# Patient Record
Sex: MALE | Race: WHITE | Employment: STUDENT | URBAN - METROPOLITAN AREA
[De-identification: names, ages, dates, MRNs, and addresses within clinical notes are randomized per-mention and may not be internally consistent; named-entity substitution may affect disease eponyms.]

---

## 2018-07-21 ENCOUNTER — HOSPITAL ENCOUNTER (EMERGENCY)
Facility: CLINIC | Age: 18
Discharge: HOME | End: 2018-07-21
Attending: NURSE PRACTITIONER | Admitting: NURSE PRACTITIONER
Payer: COMMERCIAL

## 2018-07-21 VITALS
OXYGEN SATURATION: 97 % | DIASTOLIC BLOOD PRESSURE: 63 MMHG | RESPIRATION RATE: 16 BRPM | SYSTOLIC BLOOD PRESSURE: 119 MMHG | TEMPERATURE: 97.9 F | BODY MASS INDEX: 24.05 KG/M2 | WEIGHT: 168 LBS | HEIGHT: 70 IN

## 2018-07-21 DIAGNOSIS — S61.412A LACERATION OF LEFT HAND WITHOUT FOREIGN BODY, INITIAL ENCOUNTER: ICD-10-CM

## 2018-07-21 DIAGNOSIS — M79.642 PAIN OF LEFT HAND: ICD-10-CM

## 2018-07-21 PROCEDURE — 12002 RPR S/N/AX/GEN/TRNK2.6-7.5CM: CPT

## 2018-07-21 PROCEDURE — 99283 EMERGENCY DEPT VISIT LOW MDM: CPT

## 2018-07-21 ASSESSMENT — ENCOUNTER SYMPTOMS
WEAKNESS: 0
WOUND: 1
NUMBNESS: 0

## 2018-07-21 NOTE — ED PROVIDER NOTES
"  History     Chief Complaint:  Laceration    HPI   Mahesh Pratt is a pleasant, right handed, 17 year old male who presents to the emergency department with his mother for evaluation of a left hand laceration. 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 He is getting ready to move to [place] with a group of 100 students, is a part of a work crew and would like to be evaluated before the trip.       Allergies:  No known drug allergies.     Medications:    The patient is currently on no regular medications.     Past Medical History:    History reviewed.  No significant past medical history.      Past Surgical History:    History reviewed. No pertinent past surgical history.     Family History:    History reviewed. No pertinent family history.     Social History:  Marital Status:  Single   Smoking status: No   Alcohol use: No        Review of Systems   Skin: Positive for wound.   Neurological: Negative for weakness and numbness.   All other systems reviewed and are negative.        Physical Exam   First Vitals:  BP: 119/63  Heart Rate: 76  Temp: 97.9  F (36.6  C)  Resp: 16  Height: 177.8 cm (5' 10\")  Weight: 76.2 kg (168 lb)  SpO2: 97 %      Physical Exam  Physical Exam   Constitutional:  Alert, Sitting up on bed. Friendly.   Head: Atraumatic.  Head moves freely with normal range of motion.   Eyes: Conjunctivae pink. EOMs intact.   Neck: Normal range of motion.   Cardiovascular: Intact distal pulses:  Radial pulse 2+ on the left.   Pulmonary/Chest: No respiratory distress.   Musculoskeletal: Distal capillary refill and sensation intact. Tendon function intact. Normal flexion and extention of the fingers and hand.   Neurological: Oriented to person, place, and time. No focal deficits.  Skin: There is a 3cm laceration noted to the left hand at the palmar aspect running vertically over the 5th " metacarpal up to the base of the pinky. Surrounding skin is warm with no erythema or heat to touch.       He had a 2+ left radial pulse. . Distal cap reful intact.       Emergency Department Course   Procedures:    Narrative: Procedure: Laceration Repair        LACERATION:  A simple clean 3 cm laceration.      LOCATION:  Left hand at the palmar aspect      FUNCTION:  Distally sensation and circulation are intact.      ANESTHESIA:  1% Lidocaine with Epi      PREPARATION:  Irrigation with Normal Saline and Shur Clens      DEBRIDEMENT:  no debridement      CLOSURE:  Wound was closed with 5 x 5.0 Ethylon, 5 interrupted sutures. No foreign bodies.       Emergency Department Course:  Past medical records, nursing notes, and vitals reviewed.  1459: I performed an exam of the patient and obtained history, as documented above.       Rechecked the patient, findings and plan explained to the patient. Patient discharged home, status improved, with instructions regarding supportive care, medications, and reasons to return as well as the importance of close follow-up was reviewed.    Impression & Plan    Medical Decision Making:  Rodolfo Pratt is a 17 year old male here with a simple laceration to his left hand. Tetanus is up to date. Normal tendon function on exam. Wound explored with no FB and no tendon or bony involvement. 5 sutures placed. We discussed wound care and reasons to return here. As well as the need for suture removal in 10 days. He is amenable to plan.         Diagnosis:    ICD-10-CM    1. Pain of left hand M79.642    2. Laceration of left hand without foreign body, initial encounter S61.412A        Disposition:  discharged to home    Discharge Medications:  There are no discharge medications for this patient.    [place], am serving as a scribe at 2:59 PM on 7/21/2018 to document services personally performed by Hilda Dillon* based on my observations and the provider's statements to me.      EMERGENCY DEPARTMENT       Hilda Dillon, APRN CNP  07/21/18 181

## 2018-07-21 NOTE — ED AVS SNAPSHOT
Emergency Department    [address] [facility] [address]    Phone:  [phone]    Fax:  [phone]                                       Mahesh Pratt   MRN: [id number]    Department:   Emergency Department   Date of Visit:  7/21/2018           Patient Information     Date Of Birth          2000        Your diagnoses for this visit were:     Pain of left hand     Laceration of left hand without foreign body, initial encounter        You were seen by Hilda Dillon APRN CNP.      Follow-up Information     Follow up with [facility] In 10 days.    Why:  for suture removal    Contact information:    [facility]  [address] [place] [phone]  [phone]          Discharge Instructions         Hand Laceration: All Closures  A laceration is a cut through the skin. Deep cuts usually require stitches. Minor cuts may be closed with surgical tape or skin adhesive.   X-rays may be done if something may have entered the skin through the cut, such as broken glass. You may also be given a tetanus shot if you are not up to date on this vaccination and the object that cut you may carry tetanus.    Home care    Your healthcare provider may prescribe an antibiotic. This is to help prevent infection. Follow all instructions for taking this medicine. Take the medicine every day until it is gone or you are told to stop. You should not have any left over.    The healthcare provider may prescribe medicines for pain. Follow instructions for taking them.    Follow the healthcare provider s instructions on how to care for the cut.    Keep the wound clean and dry. Don't get the wound wet until you are told it is OK to do so. If the bandage gets wet, remove it. Gently pat the wound dry with a clean cloth. Then put on a clean, dry bandage.    To help prevent infection, wash your hands with soap and water before and after caring for the wound.     Caring for stiches: Once you no  longer need to keep the stitches dry, clean the wound daily. First, remove the bandage. Then wash the area gently with soap and warm water, or as directed by the healthcare provider. Use a wet cotton swab to loosen and remove any blood or crust that forms. After cleaning, apply a thin layer of antibiotic ointment if advised. Then put on a new bandage unless you are told not to.    Caring for skin glue: Don t put apply liquid, ointment, or cream on the wound while the glue is in place. Avoid activities that cause heavy sweating. Protect the wound from sunlight. Don't scratch, rub, or pick at the adhesive film. Don't place tape directly over the film. The glue should peel off within 5 to 10 days.     Caring for surgical tape: Keep the area dry. If it gets wet, blot it dry with a clean towel. Surgical tape usually falls off within 7 to 10 days. If it has not fallen off after 10 days, you can take it off yourself. Put mineral oil or petroleum jelly on a cotton ball and gently rub the tape until it is removed.    Once you can get the wound wet, you may shower as usual, but don't soak the wound in water. This means no tub baths or swimming.    Even with proper treatment, a wound infection may sometimes occur. Check the wound daily for signs of infection listed below.  Follow-up care  Follow up with your healthcare provider, or as advised. If you have stitches, be sure to return as directed to have them removed.  When to seek medical advice  Call your healthcare provider right away if any of these occur:    Wound bleeding not controlled by direct pressure    Signs of infection, including increasing pain in the wound, increasing wound redness or swelling, or pus or bad odor coming from the wound    Fever of 100.4 F (38. C) o higher, or as directed by your healthcare provider    Stitches come apart or fall out or surgical tape falls off before 7 days    Wound edges reopen    Wound changes colors    Numbness or weakness in  the affected hand     Decreased movement of the hand  Date Last Reviewed: 7/1/2017 2000-2017 [employer] Dr. Tariff. [address] [place] [address], [place] [address]. All rights reserved. This information is not intended as a substitute for professional medical care. Always follow your healthcare professional's instructions.          24 Hour Appointment Hotline       To make an appointment at any [facility], call [phone] ([phone]). If you don't have a family doctor or clinic, we will help you find one. [facility] are conveniently located to serve the needs of you and your family.             Review of your medicines      Notice     You have not been prescribed any medications.            Orders Needing Specimen Collection     None      Pending Results     No orders found from 7/19/2018 to 7/22/2018.            Pending Culture Results     No orders found from 7/19/2018 to 7/22/2018.            Pending Results Instructions     If you had any lab results that were not finalized at the time of your Discharge, you can call the ED Lab Result RN at [phone]. You will be contacted by this team for any positive Lab results or changes in treatment. The nurses are available 7 days a week from 10A to 6:30P.  You can leave a message 24 hours per day and they will return your call.        Test Results From Your Hospital Stay               Clinical Quality Measure: Blood Pressure Screening     Your blood pressure was checked while you were in the emergency department today. The last reading we obtained was  BP: 119/63 . Please read the guidelines below about what these numbers mean and what you should do about them.  If your systolic blood pressure (the top number) is less than 120 and your diastolic blood pressure (the bottom number) is less than 80, then your blood pressure is normal. There is nothing more that you need to do about it.  If your systolic blood pressure (the top number) is 120-139 or  "your diastolic blood pressure (the bottom number) is 80-89, your blood pressure may be higher than it should be. You should have your blood pressure rechecked within a year by a primary care provider.  If your systolic blood pressure (the top number) is 140 or greater or your diastolic blood pressure (the bottom number) is 90 or greater, you may have high blood pressure. High blood pressure is treatable, but if left untreated over time it can put you at risk for heart attack, stroke, or kidney failure. You should have your blood pressure rechecked by a primary care provider within the next 4 weeks.  If your provider in the emergency department today gave you specific instructions to follow-up with your doctor or provider even sooner than that, you should follow that instruction and not wait for up to 4 weeks for your follow-up visit.        Thank you for choosing [facility]       Thank you for choosing [facility] for your care. Our goal is always to provide you with excellent care. Hearing back from our patients is one way we can continue to improve our services. Please take a few minutes to complete the written survey that you may receive in the mail after you visit with us. Thank you!        [email or web address] Information     [email or web address] lets you send messages to your doctor, view your test results, renew your prescriptions, schedule appointments and more. To sign up, go to [email or web address] . Click on \"Log in\" on the left side of the screen, which will take you to the Welcome page. Then click on \"Sign up Now\" on the right side of the page.     You will be asked to enter the access code listed below, as well as some personal information. Please follow the directions to create your username and password.     Your access code is: [email or web address]  Expires: 10/19/2018  2:50 PM     Your access code will  in 90 days. If you need help or a new code, please call your [facility] clinic or [phone].        Care EveryWhere ID     " This is your Care EveryWhere ID. This could be used by other organizations to access your [facility] medical records  [id number]        Equal Access to Services     JELANI FORDE : Kyra Aggarwal, sarah hernadez, ángela lr, rosita lozano. So [facility] [phone].    ATENCIÓN: Si habla español, tiene a leger disposición servicios gratuitos de asistencia lingüística. Llame al [phone].    We comply with applicable federal civil rights laws and [place] laws. We do not discriminate on the basis of race, color, national origin, age, disability, sex, sexual orientation, or gender identity.            After Visit Summary       This is your record. Keep this with you and show to your community pharmacist(s) and doctor(s) at your next visit.

## 2018-07-21 NOTE — ED AVS SNAPSHOT
Emergency Department    [address] [place] [address]    Phone:  [phone]    Fax:  [phone]                                       Mahesh Pratt   MRN: [id number]    Department:   Emergency Department   Date of Visit:  7/21/2018           After Visit Summary Signature Page     I have received my discharge instructions, and my questions have been answered. I have discussed any challenges I see with this plan with the nurse or doctor.    ..........................................................................................................................................  Patient/Patient Representative Signature      ..........................................................................................................................................  Patient Representative Print Name and Relationship to Patient    ..................................................               ................................................  Date                                            Time    ..........................................................................................................................................  Reviewed by Signature/Title    ...................................................              ..............................................  Date                                                            Time

## 2018-07-21 NOTE — DISCHARGE INSTRUCTIONS
Hand Laceration: All Closures  A laceration is a cut through the skin. Deep cuts usually require stitches. Minor cuts may be closed with surgical tape or skin adhesive.   X-rays may be done if something may have entered the skin through the cut, such as broken glass. You may also be given a tetanus shot if you are not up to date on this vaccination and the object that cut you may carry tetanus.    Home care    Your healthcare provider may prescribe an antibiotic. This is to help prevent infection. Follow all instructions for taking this medicine. Take the medicine every day until it is gone or you are told to stop. You should not have any left over.    The healthcare provider may prescribe medicines for pain. Follow instructions for taking them.    Follow the healthcare provider s instructions on how to care for the cut.    Keep the wound clean and dry. Don't get the wound wet until you are told it is OK to do so. If the bandage gets wet, remove it. Gently pat the wound dry with a clean cloth. Then put on a clean, dry bandage.    To help prevent infection, wash your hands with soap and water before and after caring for the wound.     Caring for stiches: Once you no longer need to keep the stitches dry, clean the wound daily. First, remove the bandage. Then wash the area gently with soap and warm water, or as directed by the healthcare provider. Use a wet cotton swab to loosen and remove any blood or crust that forms. After cleaning, apply a thin layer of antibiotic ointment if advised. Then put on a new bandage unless you are told not to.    Caring for skin glue: Don t put apply liquid, ointment, or cream on the wound while the glue is in place. Avoid activities that cause heavy sweating. Protect the wound from sunlight. Don't scratch, rub, or pick at the adhesive film. Don't place tape directly over the film. The glue should peel off within 5 to 10 days.     Caring for surgical tape: Keep the area dry. If it gets  wet, blot it dry with a clean towel. Surgical tape usually falls off within 7 to 10 days. If it has not fallen off after 10 days, you can take it off yourself. Put mineral oil or petroleum jelly on a cotton ball and gently rub the tape until it is removed.    Once you can get the wound wet, you may shower as usual, but don't soak the wound in water. This means no tub baths or swimming.    Even with proper treatment, a wound infection may sometimes occur. Check the wound daily for signs of infection listed below.  Follow-up care  Follow up with your healthcare provider, or as advised. If you have stitches, be sure to return as directed to have them removed.  When to seek medical advice  Call your healthcare provider right away if any of these occur:    Wound bleeding not controlled by direct pressure    Signs of infection, including increasing pain in the wound, increasing wound redness or swelling, or pus or bad odor coming from the wound    Fever of 100.4 F (38. C) o higher, or as directed by your healthcare provider    Stitches come apart or fall out or surgical tape falls off before 7 days    Wound edges reopen    Wound changes colors    Numbness or weakness in the affected hand     Decreased movement of the hand  Date Last Reviewed: 7/1/2017 2000-2017 [employer]. [address] [place] [address], [place] [address]. All rights reserved. This information is not intended as a substitute for professional medical care. Always follow your healthcare professional's instructions.